# Patient Record
Sex: FEMALE | Race: WHITE
[De-identification: names, ages, dates, MRNs, and addresses within clinical notes are randomized per-mention and may not be internally consistent; named-entity substitution may affect disease eponyms.]

---

## 2018-04-16 ENCOUNTER — HOSPITAL ENCOUNTER (INPATIENT)
Dept: HOSPITAL 74 - JLDR | Age: 33
LOS: 3 days | Discharge: HOME | End: 2018-04-19
Attending: OBSTETRICS & GYNECOLOGY | Admitting: OBSTETRICS & GYNECOLOGY
Payer: COMMERCIAL

## 2018-04-16 VITALS — BODY MASS INDEX: 34.5 KG/M2

## 2018-04-16 DIAGNOSIS — O48.0: Primary | ICD-10-CM

## 2018-04-16 DIAGNOSIS — O36.63X0: ICD-10-CM

## 2018-04-16 DIAGNOSIS — Z3A.40: ICD-10-CM

## 2018-04-16 LAB
ANION GAP SERPL CALC-SCNC: 10 MMOL/L (ref 8–16)
APTT BLD: 26.4 SECONDS (ref 26.9–34.4)
BASOPHILS # BLD: 0.5 % (ref 0–2)
BUN SERPL-MCNC: 8 MG/DL (ref 7–18)
CALCIUM SERPL-MCNC: 8.7 MG/DL (ref 8.5–10.1)
CHLORIDE SERPL-SCNC: 108 MMOL/L (ref 98–107)
CO2 SERPL-SCNC: 22 MMOL/L (ref 21–32)
CREAT SERPL-MCNC: 0.5 MG/DL (ref 0.55–1.02)
DEPRECATED RDW RBC AUTO: 18 % (ref 11.6–15.6)
EOSINOPHIL # BLD: 0.2 % (ref 0–4.5)
GLUCOSE SERPL-MCNC: 108 MG/DL (ref 74–106)
HCT VFR BLD CALC: 32.2 % (ref 32.4–45.2)
HGB BLD-MCNC: 10.3 GM/DL (ref 10.7–15.3)
INR BLD: 0.96 (ref 0.82–1.09)
LYMPHOCYTES # BLD: 18.5 % (ref 8–40)
MCH RBC QN AUTO: 22.9 PG (ref 25.7–33.7)
MCHC RBC AUTO-ENTMCNC: 31.9 G/DL (ref 32–36)
MCV RBC: 71.9 FL (ref 80–96)
MONOCYTES # BLD AUTO: 3.8 % (ref 3.8–10.2)
NEUTROPHILS # BLD: 77 % (ref 42.8–82.8)
PLATELET # BLD AUTO: 154 K/MM3 (ref 134–434)
PMV BLD: 9.3 FL (ref 7.5–11.1)
POTASSIUM SERPLBLD-SCNC: 3.8 MMOL/L (ref 3.5–5.1)
PT PNL PPP: 10.9 SEC (ref 9.98–11.88)
RBC # BLD AUTO: 4.48 M/MM3 (ref 3.6–5.2)
RPR: NONREACTIVE
SODIUM SERPL-SCNC: 140 MMOL/L (ref 136–145)
WBC # BLD AUTO: 7 K/MM3 (ref 4–10)

## 2018-04-16 RX ADMIN — SODIUM CHLORIDE, SODIUM GLUCONATE, SODIUM ACETATE, POTASSIUM CHLORIDE, AND MAGNESIUM CHLORIDE SCH MLS/HR: 526; 502; 368; 37; 30 INJECTION, SOLUTION INTRAVENOUS at 21:00

## 2018-04-16 RX ADMIN — SODIUM CHLORIDE, SODIUM GLUCONATE, SODIUM ACETATE, POTASSIUM CHLORIDE, AND MAGNESIUM CHLORIDE SCH MLS/HR: 526; 502; 368; 37; 30 INJECTION, SOLUTION INTRAVENOUS at 10:15

## 2018-04-16 RX ADMIN — SODIUM CHLORIDE, SODIUM GLUCONATE, SODIUM ACETATE, POTASSIUM CHLORIDE, AND MAGNESIUM CHLORIDE SCH MLS/HR: 526; 502; 368; 37; 30 INJECTION, SOLUTION INTRAVENOUS at 15:47

## 2018-04-16 NOTE — PN
Progress Note (short form)





- Note


Progress Note: 





cx 4 cm, 80 vx -3 mi, fhr cat 1, contraction q 3 min

## 2018-04-16 NOTE — PN
Progress Note (short form)





- Note


Progress Note: 





full 100 vx -1, fhr car 1





Problem List





- Problems


(1) Post term pregnancy over 40 weeks


Code(s): O48.0 - POST-TERM PREGNANCY   





(2) LGA (large for gestational age) fetus


Code(s): HZJ4834 -

## 2018-04-16 NOTE — HP
Past Medical History





- Primary Care Physician


PCP:: Velasquez Guerra





- Admission


Chief Complaint: post term pregnancy  for pitocin induction


History of Present Illness: 





33 yo f  40.3 weeks, admitted for pitocin induction, rba discussed , cx 

3 cm 75 vx -3 mi, fhe cat i, irregular mild contraction not felt by patient, 

previous hx of LGA pregnancy ,risks of induction discusseds


History Source: Patient


Limitations to Obtaining History: No Limitations





- Past Medical History


...: 3


...Para: 2


...Term: 2


...LMP: 17


... Weeks Gestation by Dates: 40.3


...EDC by Dates: 18





- Past Surgical History


Hx Myomectomy: No


Hx Transabdominal Cerclage: No





- Smoking History


Smoking history: Never smoked


Have you smoked in the past 12 months: No





- Alcohol/Substance Use


Hx Alcohol Use: No





- Social History


Usual Living Arrangement: Yes: With Spouse


History of Recent Travel: No





Home Medications





- Allergies


Allergies/Adverse Reactions: 


 Allergies











Allergy/AdvReac Type Severity Reaction Status Date / Time


 


No Known Allergies Allergy   Verified 18 09:18














- Home Medications


Home Medications: 


Ambulatory Orders





Prenatal Vit No.130/Iron/Folic [Prenatal Vitamins] 1 each PO DAILY 03/09/15 











Review of Systems





- Review of Systems


Constitutional: reports: No Symptoms


Eyes: reports: No Symptoms


HENT: reports: No Symptoms


Cardiovascular: reports: No Symptoms


Respiratory: reports: No Symptoms


Gastrointestinal: reports: No Symptoms


Genitourinary: reports: No Symptoms


Breasts: reports: No Symptoms Reported


Musculoskeletal: reports: Back Pain


Integumentary: reports: No Symptoms


Neurological: reports: No Symptoms


Endocrine: reports: No Symptoms


Hematology/Lymphatic: reports: No Symptoms


Psychiatric: reports: No Symptoms





Physical Exam - Maternity


Vital Signs: 


 Vital Signs











Temperature  99.5 F   18 14:00


 


Pulse Rate  68   18 14:00


 


Respiratory Rate  20   18 14:00


 


Blood Pressure  136/82   18 14:00


 


O2 Sat by Pulse Oximetry (%)      











Constitutional: Yes: Well Nourished, No Distress, Calm


Eyes: Yes: WNL, Conjunctiva Clear, EOM Intact


HENT: Yes: WNL, Atraumatic, Normocephalic


Neck: Yes: WNL, Supple, Trachea Midline


Cardiovascular: Yes: WNL, Regular Rate and Rhythm


Breast(s): Yes: WNL





- Abdominal Exam/OB


Fundal Height: 40


Number of Fetuses: Single


Fetal Presentation: Vertex


Contractions: Yes


Regularity: Irregular


Intensity: Unaware


Fetal Monitor Mode: External


Fetal Heart Rate Location: [facility]


Category: I


Accelerations: Uniform


Decelerations: None





- Vaginal Exam/OB


Vaginal Bleediing: No


Speculum Exam: No


Dilatation (cm): 3 cm


Effacement (%): 70


Amniotic Membrane Status: Intact


Fetal Station: -3





- Physical Exam


Musculoskeletal: Yes: WNL


Extremities: Yes: WNL


Edema: Yes


Edema: LLE: Trace, RLE: Trace


Deep Tendon Reflex Grade: Normal +2


...Motor Strength: WNL


Psychiatric: Yes: WNL





- Labs


Lab Results: 


 CBC, BMP





 18 10:00 





 18 10:00 











Hemorrhage Risk Assessment





- Risk Factors


Medium Risk Factors: Yes: None


High Risk Factors: Yes: None


Risk Score: 1


Risk Level: Medium Risk





Problem List





- Problems


(1) Post term pregnancy over 40 weeks


Code(s): O48.0 - POST-TERM PREGNANCY   





(2) LGA (large for gestational age) fetus


Code(s): [id number] -    





Assessment/Plan





pitocin induction, rba discussed


fhm,pain management

## 2018-04-16 NOTE — PN
Progress Note (short form)





- Note


Progress Note: 


924 pm \


cx 5 cm, 80 vx -2 arom, clear , fhr cat 1, regular contraction q 2 min





Problem List





- Problems


(1) Post term pregnancy over 40 weeks


Code(s): O48.0 - POST-TERM PREGNANCY   





(2) LGA (large for gestational age) fetus


Code(s): IXP0985 -

## 2018-04-17 LAB
ARTERIAL BLOOD GAS PCO2: 56.5 MMHG (ref 35–45)
BASE EXCESS BLDA CALC-SCNC: -3.1 MEQ/L (ref -2–2)
PH BLDV: 7.34 [PH] (ref 7.32–7.42)
PO2 BLDA: 21.2 MMHG (ref 80–100)
SAO2 % BLDA: 32.2 % (ref 90–98.9)
VENOUS PC02: 44 MMHG (ref 38–52)
VENOUS PO2: 24.6 MMHG (ref 28–48)

## 2018-04-17 PROCEDURE — 0HQ9XZZ REPAIR PERINEUM SKIN, EXTERNAL APPROACH: ICD-10-PCS | Performed by: OBSTETRICS & GYNECOLOGY

## 2018-04-17 RX ADMIN — FERROUS SULFATE TAB EC 324 MG (65 MG FE EQUIVALENT) SCH MG: 324 (65 FE) TABLET DELAYED RESPONSE at 21:50

## 2018-04-17 RX ADMIN — FERROUS SULFATE TAB EC 324 MG (65 MG FE EQUIVALENT) SCH MG: 324 (65 FE) TABLET DELAYED RESPONSE at 09:40

## 2018-04-17 RX ADMIN — Medication SCH TAB: at 09:40

## 2018-04-17 NOTE — DS
Physical Exam-GYN


Vital Signs: 


 Vital Signs











Temperature  98.6 F   18 06:00


 


Pulse Rate  77   18 06:00


 


Respiratory Rate  20   18 06:00


 


Blood Pressure  140/80   18 06:00


 


O2 Sat by Pulse Oximetry (%)  100   18 01:20











Constitutional: Yes: Well Nourished, No Distress, Calm


Eyes: Yes: WNL, Conjunctiva Clear, EOM Intact


HENT: Yes: WNL, Atraumatic, Normocephalic


Neck: Yes: WNL, Supple, Trachea Midline


Cardiovascular: Yes: WNL, Regular Rate and Rhythm


Respiratory: Yes: WNL, Regular, CTA Bilaterally


Gastrointestinal: Yes: WNL


...Rectal Exam: Yes: WNL


Renal/: Yes: WNL


....Post Partum: Yes: Uterus firm, Uterus non-tender, Slight lochia rubra


Breast(s): Yes: WNL


Musculoskeletal: Yes: WNL


Extremities: Yes: WNL


Edema: RLE: Trace


Integumentary: Yes: WNL


Neurological: Yes: WNL, Alert, Oriented


...Motor Strength: WNL


Psychiatric: Yes: WNL, Alert, Oriented


Labs: 


 CBC, BMP





 18 10:00 





 18 10:00 











Delivery





- Delivery


Vaginal Delivery: Spontaneous (no complication)


Type of Anesthesia: Local, Epidural


Episiotomy/Laceration: Vaginal Extension/lac, 1st degree


EBL (cc): 300





Delivery, Single Birth





- Stages of Labor


Date 1st Stage Initiatied: 18


Time 1st Stage Initiated: 21:00


Date 2nd Stage Initiated: 18


Time 2nd Stage Initiated: 22:50


Date of Delivery: 18


Time of Delivery: 00:14


Time Placenta Delivered: 00:25


Placenta: Yes: Spontaneous





- Condition of Infant


Pediatrician/Neonatologist Present: Yes


Name: Cayden Novoa


Infant Gender: Female


Birth Weight: 9 lb 2 oz


Position: Left, OA


Total Hours ROM (Hrs/Mins): 2hrs 59min





- Apgar


  ** 1 Minute


Apgar Total Score: 9





  ** 5 Minutes


Apgar Total Score: 9





- [place] Feeding Plan


Initial Plan: Exclusive breastfeeding throughout hospitalization





Discharge Summary


Reason For Visit: INDUCTION OF LABOR


Current Active Problems





LGA (large for gestational age) fetus (Acute)


Post term pregnancy over 40 weeks (Acute)








Procedures: Principal: pitocin induction, post date, 


Hospital Course: 





no complication





- Instructions


Diet, Activity, Other Instructions: 


regular diet, follow up office 4 weeks, no intercourse





- Home Medications


Comprehensive Discharge Medication List: 


Ambulatory Orders





Prenatal Vit No.130/Iron/Folic [Prenatal Vitamins] 1 each PO DAILY 03/09/15

## 2018-04-18 LAB
BASOPHILS # BLD: 0.6 % (ref 0–2)
DEPRECATED RDW RBC AUTO: 18.5 % (ref 11.6–15.6)
EOSINOPHIL # BLD: 1.2 % (ref 0–4.5)
HCT VFR BLD CALC: 29.3 % (ref 32.4–45.2)
HGB BLD-MCNC: 9.4 GM/DL (ref 10.7–15.3)
LYMPHOCYTES # BLD: 17.3 % (ref 8–40)
MCH RBC QN AUTO: 23.2 PG (ref 25.7–33.7)
MCHC RBC AUTO-ENTMCNC: 32.1 G/DL (ref 32–36)
MCV RBC: 72.3 FL (ref 80–96)
MONOCYTES # BLD AUTO: 4.3 % (ref 3.8–10.2)
NEUTROPHILS # BLD: 76.6 % (ref 42.8–82.8)
PLATELET # BLD AUTO: 146 K/MM3 (ref 134–434)
PMV BLD: 8.8 FL (ref 7.5–11.1)
RBC # BLD AUTO: 4.05 M/MM3 (ref 3.6–5.2)
WBC # BLD AUTO: 8.3 K/MM3 (ref 4–10)

## 2018-04-18 RX ADMIN — FERROUS SULFATE TAB EC 324 MG (65 MG FE EQUIVALENT) SCH MG: 324 (65 FE) TABLET DELAYED RESPONSE at 22:42

## 2018-04-18 RX ADMIN — FERROUS SULFATE TAB EC 324 MG (65 MG FE EQUIVALENT) SCH MG: 324 (65 FE) TABLET DELAYED RESPONSE at 09:25

## 2018-04-18 RX ADMIN — Medication SCH TAB: at 09:25

## 2018-04-18 NOTE — PN
Post Partum Progress Note





- Subjective


Subjective: 





No complaints


Post Partum Day: 1


Type of Delivery: 


Vital Signs: 


 Vital Signs











Temperature  97.7 F   18 21:06


 


Pulse Rate  70   18 21:06


 


Respiratory Rate  20   18 21:06


 


Blood Pressure  131/74   18 21:06


 


O2 Sat by Pulse Oximetry (%)  100   18 01:20











Breast Exam: Yes: Soft


Uterus: Yes: Fundus Firm, Non-tender


Abdomen/GI: Yes: Abdomen soft, Passing flatus, Tolerating PO


Lochia: Yes: Rubra


Lochia, amount: Small


Extremities: Yes: Calves non-tender


Activity: Ambulating





- Labs


Labs: 


 CBC











WBC  8.3 K/mm3 (4.0-10.0)   18  07:00    


 


RBC  4.05 M/mm3 (3.60-5.2)   18  07:00    


 


Hgb  9.4 GM/dL (10.7-15.3)  L  18  07:00    


 


Hct  29.3 % (32.4-45.2)  L  18  07:00    


 


MCV  72.3 fl (80-96)  L  18  07:00    


 


MCH  23.2 pg (25.7-33.7)  L  18  07:00    


 


MCHC  32.1 g/dl (32.0-36.0)   18  07:00    


 


RDW  18.5 % (11.6-15.6)  H  18  07:00    


 


Plt Count  146 K/MM3 (134-434)   18  07:00    


 


MPV  8.8 fl (7.5-11.1)   18  07:00    


 


Neutrophils %  76.6 % (42.8-82.8)   18  07:00    


 


Lymphocytes %  17.3 % (8-40)   18  07:00    


 


Monocytes %  4.3 % (3.8-10.2)   18  07:00    


 


Eosinophils %  1.2 % (0-4.5)  D 18  07:00    


 


Basophils %  0.6 % (0-2.0)   18  07:00    














Assessment/Plan





31yo s/p , doing well stable, afebrile.


Postpartum care instructions reviewed.


Continue routine postpartum care.


Ambulation encouraged


Discharge instruction reviewed.

## 2018-04-19 VITALS — SYSTOLIC BLOOD PRESSURE: 121 MMHG | TEMPERATURE: 97.6 F | DIASTOLIC BLOOD PRESSURE: 76 MMHG | HEART RATE: 78 BPM

## 2018-04-19 RX ADMIN — FERROUS SULFATE TAB EC 324 MG (65 MG FE EQUIVALENT) SCH MG: 324 (65 FE) TABLET DELAYED RESPONSE at 09:58

## 2018-04-19 RX ADMIN — Medication SCH TAB: at 09:58

## 2018-04-19 NOTE — PN
Post Partum Progress Note





- Subjective


Subjective: 


Patient without acute complaints. 


Reports tolerating oral intake without nausea or vomiting. 


Ambulating without dizziness. 


Denies fevers or chills.


Pain well controlled with oral pain medication.


Breastfeeding without difficulty.


Passing flatus.


Post Partum Day: 2


Type of Delivery: 


Vital Signs: 


 Vital Signs











Temperature  97.6 F   18 08:05


 


Pulse Rate  78   18 08:05


 


Respiratory Rate  20   18 08:05


 


Blood Pressure  121/76   18 08:05


 


O2 Sat by Pulse Oximetry (%)  100   18 01:20











Breast Exam: Yes: Soft


Uterus: Yes: Fundus Firm, Fundus below umbilicus


Abdomen/GI: Yes: Abdomen soft, Passing flatus, Tolerating PO.  No: Abdominal 

Distention, Tender


Lochia: Yes: Serosa


Lochia, amount: Small


Extremities: Yes: Calves non-tender, Edema (+1)


Activity: Ambulating





- Labs


Labs: 


 CBC











WBC  8.3 K/mm3 (4.0-10.0)   18  07:00    


 


RBC  4.05 M/mm3 (3.60-5.2)   18  07:00    


 


Hgb  9.4 GM/dL (10.7-15.3)  L  18  07:00    


 


Hct  29.3 % (32.4-45.2)  L  18  07:00    


 


MCV  72.3 fl (80-96)  L  18  07:00    


 


MCH  23.2 pg (25.7-33.7)  L  18  07:00    


 


MCHC  32.1 g/dl (32.0-36.0)   18  07:00    


 


RDW  18.5 % (11.6-15.6)  H  18  07:00    


 


Plt Count  146 K/MM3 (134-434)   18  07:00    


 


MPV  8.8 fl (7.5-11.1)   18  07:00    


 


Neutrophils %  76.6 % (42.8-82.8)   18  07:00    


 


Lymphocytes %  17.3 % (8-40)   18  07:00    


 


Monocytes %  4.3 % (3.8-10.2)   18  07:00    


 


Eosinophils %  1.2 % (0-4.5)  D 18  07:00    


 


Basophils %  0.6 % (0-2.0)   18  07:00    














Assessment/Plan





31 yo PPD # 2 s/p , afebrile, vital signs stable, stable for discharge home 

today 


1. Patient stable for discharge home today.


2. Patient encouraged to contact MD for:


- Severe pain not controlled by oral pain medication


- Fevers or chills


- Nausea or vomiting, intolerance of oral intake


3. Patient to follow up in office in 4-6 weeks for postpartum visit

## 2018-06-28 ENCOUNTER — HOSPITAL ENCOUNTER (OUTPATIENT)
Dept: HOSPITAL 74 - JASU-SURG | Age: 33
Discharge: HOME | End: 2018-06-28
Attending: SURGERY
Payer: COMMERCIAL

## 2018-06-28 VITALS — SYSTOLIC BLOOD PRESSURE: 107 MMHG | DIASTOLIC BLOOD PRESSURE: 60 MMHG | HEART RATE: 60 BPM

## 2018-06-28 VITALS — BODY MASS INDEX: 28 KG/M2

## 2018-06-28 VITALS — TEMPERATURE: 97.9 F

## 2018-06-28 DIAGNOSIS — D21.3: Primary | ICD-10-CM

## 2018-06-28 LAB
DEPRECATED RDW RBC AUTO: 19.9 % (ref 11.6–15.6)
HCT VFR BLD CALC: 36 % (ref 32.4–45.2)
HGB BLD-MCNC: 12 GM/DL (ref 10.7–15.3)
MCH RBC QN AUTO: 25.9 PG (ref 25.7–33.7)
MCHC RBC AUTO-ENTMCNC: 33.3 G/DL (ref 32–36)
MCV RBC: 77.7 FL (ref 80–96)
PLATELET # BLD AUTO: 198 K/MM3 (ref 134–434)
PMV BLD: 7.7 FL (ref 7.5–11.1)
RBC # BLD AUTO: 4.63 M/MM3 (ref 3.6–5.2)
WBC # BLD AUTO: 4.2 K/MM3 (ref 4–10)

## 2018-06-28 PROCEDURE — 0JB60ZZ EXCISION OF CHEST SUBCUTANEOUS TISSUE AND FASCIA, OPEN APPROACH: ICD-10-PCS | Performed by: SURGERY

## 2018-06-28 NOTE — SURG
Surgery First Assist Note


First Assist: Carlos Alfred PA-C


Date of Service: 06/28/18


Diagnosis: 


Right axillary mass





Procedure: 


Excision of right axillary mass





I was present for the entirety of the operative procedure. For further detail, 

please refer to operative report.

## 2018-06-28 NOTE — OP
DATE OF OPERATION:  06/28/2018

 

SURGEON:  Tanner Pizarro MD

 

ASSISTANT:  Dr. Carlos Gamino 

 

PREOPERATIVE DIAGNOSIS:  Right axillary mass.

 

POSTOPERATIVE DIAGNOSIS:  Right axillary mass.

 

PROCEDURE:  Excision of right axillary mass.

 

SPECIMEN:  Right axillary mass.

 

ESTIMATED BLOOD LOSS:  5 mL.

 

DRAINS:  None.

 

ANESTHESIA:  MAC/local.

 

REASON FOR PROCEDURE:  This is a 32-year-old female who presented to the office 
for a

right axillary mass.  After examining and confirming there was an axillary mass
, the

risks and benefits of the procedure of an excision of axillary mass were 
explained. 

These included bleeding; infection; injury to surrounding structures, including

vessel injury, nerve injury, injury to the breast ductal system of the breast,

brachial plexus, axillary nerves; neuralgia; wound dehiscence; abscess formation
;

seroma; hematoma, were explained as some of the complications.  She understood 
and

signed informed consent.

 

DESCRIPTION OF PROCEDURE:  The patient was placed supine on the operating 
table.  She

underwent MAC by anesthesia.  The area was prepped and draped in sterile 
fashion. 

Timeout was performed.  A curvilinear incision was made over the level of the 
mass. 

A dissection was performed and the mass entirely dissected free 
circumferentially and

sent off the field.  Hemostasis was obtained.  Copious irrigation was performed 
until

dry.  A 3-0 Vicryl was used to close the deep dermal tissue and 4-0 Biosyn used 
to

close the subcutaneous tissue.  Sterile dressings were applied.  The patient

tolerated the procedure well, transferred to recovery room in stable condition.

 

 

TANNER PIZARRO M.D.

 

BRIAN/[id number]

DD: 06/28/2018 14:35

DT: 06/28/2018 15:32

Job #:  [id number]

MTDD

## 2018-06-28 NOTE — OP
Operative Note





- Note:


Operative Date: 06/28/18


Pre-Operative Diagnosis: Right axillary mass


Operation: Excision of Right axillary mass


Anesthesia: MAC


Estimated Blood Loss (mls): 5


Fluid Volume Replaced (mls): 700


Operative Report Dictated: Yes

## 2018-06-28 NOTE — HP
History & Physical Update





- History


History: No Change





- Physical


Physical: No Change





- Assessment


Assessment: No Change





- Plan


Plan: No Change





<Carlos Alfred - Last Filed: 06/28/18 11:40>





- Plan


Currently as noted:: Excision of right axillary mass





<Tom Pizarro - Last Filed: 06/28/18 12:55>

## 2018-07-03 NOTE — PATH
Surgical Pathology Report



Patient Name:  MILANA STEIN

Accession #:  [id number]

[facility]. Rec. #:  [id number]                                                        

   /Age/Gender:  1985 (Age: 32) / F

Account:  [id number]                                                          

             Location: [facility] SURGICAL

Taken:  2018

Received:  2018

Reported:  7/3/2018

Physicians:  Tom Pizarro M.D.

  



Specimen(s) Received

 RIGHT AXILLARY MASS 





Clinical History

Right axillary mass







Final Diagnosis

RIGHT BREAST AXILLARY MASS, EXCISION:

ACCESSORY BREAST TISSUE WITH FOCAL LACTATIONAL CHANGE AND MILD CHRONIC

INFLAMMATION.





***Electronically Signed***

Cynthia Tidwell M.D.





Gross Description

Received in formalin labeled "right axillary mass," is a 2.2 x 1.2 x 0.8 cm

tan-yellow, irregular, unoriented portion of soft tissue. The specimen is inked

blue and serially sectioned. Sectioning reveals tan-yellow, focally firm

fibrofatty tissue. The specimen is entirely and sequentially submitted in 3

cassettes.

/2018



saudi/[email or web address]

## 2020-12-04 ENCOUNTER — HOSPITAL ENCOUNTER (EMERGENCY)
Dept: HOSPITAL 74 - JERFT | Age: 35
Discharge: HOME | End: 2020-12-04
Payer: COMMERCIAL

## 2020-12-04 VITALS — HEART RATE: 74 BPM | SYSTOLIC BLOOD PRESSURE: 121 MMHG | DIASTOLIC BLOOD PRESSURE: 45 MMHG | TEMPERATURE: 97 F

## 2020-12-04 VITALS — BODY MASS INDEX: 31.3 KG/M2

## 2020-12-04 DIAGNOSIS — S61.412A: Primary | ICD-10-CM

## 2021-03-25 ENCOUNTER — HOSPITAL ENCOUNTER (INPATIENT)
Dept: HOSPITAL 74 - JLDR | Age: 36
LOS: 2 days | Discharge: HOME | End: 2021-03-27
Attending: OBSTETRICS & GYNECOLOGY | Admitting: OBSTETRICS & GYNECOLOGY
Payer: COMMERCIAL

## 2021-03-25 VITALS — BODY MASS INDEX: 36 KG/M2

## 2021-03-25 DIAGNOSIS — O48.0: Primary | ICD-10-CM

## 2021-03-25 DIAGNOSIS — O26.893: ICD-10-CM

## 2021-03-25 DIAGNOSIS — Z67.11: ICD-10-CM

## 2021-03-25 DIAGNOSIS — D64.9: ICD-10-CM

## 2021-03-25 DIAGNOSIS — O36.0130: ICD-10-CM

## 2021-03-25 DIAGNOSIS — Z3A.40: ICD-10-CM

## 2021-03-25 LAB
ANION GAP SERPL CALC-SCNC: 6 MMOL/L (ref 8–16)
ANISOCYTOSIS BLD QL: (no result)
APTT BLD: 27.5 SECONDS (ref 25.2–36.5)
BASE EXCESS BLDCOA CALC-SCNC: -5.3 MMOL/L (ref 0–2)
BASOPHILS # BLD: 0.2 % (ref 0–2)
BUN SERPL-MCNC: 8.1 MG/DL (ref 7–18)
CALCIUM SERPL-MCNC: 8.5 MG/DL (ref 8.5–10.1)
CHLORIDE SERPL-SCNC: 109 MMOL/L (ref 98–107)
CO2 SERPL-SCNC: 22 MMOL/L (ref 21–32)
CREAT SERPL-MCNC: 0.5 MG/DL (ref 0.55–1.3)
DEPRECATED RDW RBC AUTO: 17.6 % (ref 11.6–15.6)
EOSINOPHIL # BLD: 0.1 % (ref 0–4.5)
GLUCOSE SERPL-MCNC: 96 MG/DL (ref 74–106)
HCO3 BLDCO-SCNC: 19.8 MMHG (ref 20–29)
HCT VFR BLD CALC: 30.1 % (ref 32.4–45.2)
HGB BLD-MCNC: 9.6 GM/DL (ref 10.7–15.3)
INR BLD: 0.92 (ref 0.83–1.09)
LYMPHOCYTES # BLD: 11.6 % (ref 8–40)
MACROCYTES BLD QL: 0
MCH RBC QN AUTO: 21.8 PG (ref 25.7–33.7)
MCHC RBC AUTO-ENTMCNC: 31.8 G/DL (ref 32–36)
MCV RBC: 68.5 FL (ref 80–96)
MONOCYTES # BLD AUTO: 4.7 % (ref 3.8–10.2)
NEUTROPHILS # BLD: 83.4 % (ref 42.8–82.8)
PCO2 BLDCO: 38 MMHG (ref 30–78)
PH BLDCO: 7.33 [PH] (ref 7.14–7.44)
PLATELET # BLD AUTO: 199 K/MM3 (ref 134–434)
PLATELET BLD QL SMEAR: NORMAL
PMV BLD: 9.5 FL (ref 7.5–11.1)
POTASSIUM SERPLBLD-SCNC: 3.8 MMOL/L (ref 3.5–5.1)
PT PNL PPP: 11.3 SEC (ref 9.7–13)
RBC # BLD AUTO: 4.39 M/MM3 (ref 3.6–5.2)
SODIUM SERPL-SCNC: 137 MMOL/L (ref 136–145)
TARGETS BLD QL SMEAR: (no result)
WBC # BLD AUTO: 7.2 K/MM3 (ref 4–10)

## 2021-03-25 PROCEDURE — U0005 INFEC AGEN DETEC AMPLI PROBE: HCPCS

## 2021-03-25 PROCEDURE — C9803 HOPD COVID-19 SPEC COLLECT: HCPCS

## 2021-03-25 PROCEDURE — U0003 INFECTIOUS AGENT DETECTION BY NUCLEIC ACID (DNA OR RNA); SEVERE ACUTE RESPIRATORY SYNDROME CORONAVIRUS 2 (SARS-COV-2) (CORONAVIRUS DISEASE [COVID-19]), AMPLIFIED PROBE TECHNIQUE, MAKING USE OF HIGH THROUGHPUT TECHNOLOGIES AS DESCRIBED BY CMS-2020-01-R: HCPCS

## 2021-03-25 RX ADMIN — Medication SCH MLS/HR: at 10:35

## 2021-03-25 RX ADMIN — FERROUS SULFATE TAB EC 324 MG (65 MG FE EQUIVALENT) SCH MG: 324 (65 FE) TABLET DELAYED RESPONSE at 18:28

## 2021-03-25 RX ADMIN — Medication SCH MLS/HR: at 12:50

## 2021-03-26 LAB
BASOPHILS # BLD: 0.4 % (ref 0–2)
DEPRECATED RDW RBC AUTO: 17.8 % (ref 11.6–15.6)
EOSINOPHIL # BLD: 0.5 % (ref 0–4.5)
HCT VFR BLD CALC: 25.9 % (ref 32.4–45.2)
HGB BLD-MCNC: 8.2 GM/DL (ref 10.7–15.3)
LYMPHOCYTES # BLD: 18.9 % (ref 8–40)
MCH RBC QN AUTO: 21.9 PG (ref 25.7–33.7)
MCHC RBC AUTO-ENTMCNC: 31.8 G/DL (ref 32–36)
MCV RBC: 68.8 FL (ref 80–96)
MONOCYTES # BLD AUTO: 3.5 % (ref 3.8–10.2)
NEUTROPHILS # BLD: 76.7 % (ref 42.8–82.8)
PLATELET # BLD AUTO: 180 K/MM3 (ref 134–434)
PMV BLD: 9.4 FL (ref 7.5–11.1)
RBC # BLD AUTO: 3.77 M/MM3 (ref 3.6–5.2)
WBC # BLD AUTO: 7.7 K/MM3 (ref 4–10)

## 2021-03-26 RX ADMIN — Medication SCH TAB: at 10:28

## 2021-03-26 RX ADMIN — FERROUS SULFATE TAB EC 324 MG (65 MG FE EQUIVALENT) SCH MG: 324 (65 FE) TABLET DELAYED RESPONSE at 10:28

## 2021-03-26 RX ADMIN — FERROUS SULFATE TAB EC 324 MG (65 MG FE EQUIVALENT) SCH MG: 324 (65 FE) TABLET DELAYED RESPONSE at 17:27

## 2021-03-27 VITALS — SYSTOLIC BLOOD PRESSURE: 122 MMHG | TEMPERATURE: 98.7 F | HEART RATE: 76 BPM | DIASTOLIC BLOOD PRESSURE: 74 MMHG

## 2021-03-27 RX ADMIN — FERROUS SULFATE TAB EC 324 MG (65 MG FE EQUIVALENT) SCH MG: 324 (65 FE) TABLET DELAYED RESPONSE at 10:15

## 2021-03-27 RX ADMIN — Medication SCH TAB: at 10:15
